# Patient Record
Sex: FEMALE | Race: WHITE | URBAN - METROPOLITAN AREA
[De-identification: names, ages, dates, MRNs, and addresses within clinical notes are randomized per-mention and may not be internally consistent; named-entity substitution may affect disease eponyms.]

---

## 2018-02-13 ENCOUNTER — OFFICE VISIT (OUTPATIENT)
Dept: ENDOCRINOLOGY | Facility: MEDICAL CENTER | Age: 63
End: 2018-02-13
Payer: COMMERCIAL

## 2018-02-13 VITALS
SYSTOLIC BLOOD PRESSURE: 112 MMHG | HEART RATE: 73 BPM | OXYGEN SATURATION: 96 % | DIASTOLIC BLOOD PRESSURE: 70 MMHG | HEIGHT: 62 IN | WEIGHT: 176 LBS | BODY MASS INDEX: 32.39 KG/M2

## 2018-02-13 DIAGNOSIS — E53.8 VITAMIN B12 DEFICIENCY: ICD-10-CM

## 2018-02-13 DIAGNOSIS — Z78.0 MENOPAUSE: ICD-10-CM

## 2018-02-13 DIAGNOSIS — E55.9 VITAMIN D DEFICIENCY: ICD-10-CM

## 2018-02-13 DIAGNOSIS — E89.0 POST-SURGICAL HYPOTHYROIDISM: ICD-10-CM

## 2018-02-13 PROCEDURE — 99205 OFFICE O/P NEW HI 60 MIN: CPT | Performed by: INTERNAL MEDICINE

## 2018-02-13 RX ORDER — LEVOTHYROXINE SODIUM 175 MCG
175 TABLET ORAL
Qty: 30 TAB | Refills: 3 | Status: SHIPPED | OUTPATIENT
Start: 2018-02-13 | End: 2018-06-13 | Stop reason: SDUPTHER

## 2018-02-13 RX ORDER — ESTRADIOL 1 MG/1
1 TABLET ORAL DAILY
Qty: 30 TAB | Refills: 3 | Status: SHIPPED | OUTPATIENT
Start: 2018-02-13 | End: 2018-06-15 | Stop reason: SDUPTHER

## 2018-02-13 RX ORDER — OMEPRAZOLE 40 MG/1
40 CAPSULE, DELAYED RELEASE ORAL DAILY
COMMUNITY

## 2018-02-13 RX ORDER — BIOTIN 1 MG
TABLET ORAL
COMMUNITY

## 2018-02-13 RX ORDER — M-VIT,TX,IRON,MINS/CALC/FOLIC 27MG-0.4MG
1 TABLET ORAL DAILY
COMMUNITY

## 2018-02-13 RX ORDER — LEVOTHYROXINE SODIUM 0.12 MG/1
125 TABLET ORAL
COMMUNITY
End: 2018-02-13

## 2018-02-13 RX ORDER — ESTRADIOL 1 MG/1
1 TABLET ORAL DAILY
Qty: 30 TAB | Refills: 3 | Status: SHIPPED | OUTPATIENT
Start: 2018-02-13 | End: 2018-02-13 | Stop reason: SDUPTHER

## 2018-02-14 NOTE — PROGRESS NOTES
New Patient Consult Note  Primary care physician: Elizabeth Varma M.D.    Reason for consult: Postsurgical hypothyroidism    HPI:  Kat Scott is a 62 y.o. old patient who comes in today for evaluation of postsurgical hypothyroidism. 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 Currently she is on 125 mcg of synthroid brand daily. She feels fatigued and tired at all times.    She also has a history of menopause and increased night sweating from it.    ROS:  Constitutional: fatigue, increase night sweats, No weight loss  Cardiac: No palpitations or racing heart  Resp: No shortness of breath  Neuro: No numbness or tinging in feet  Endo: No heat or cold intolerance, no polyuria or polydipsia  All other systems were reviewed and were negative.    Past Medical History:  There are no active problems to display for this patient.      Past Surgical History:  Past Surgical History:   Procedure Laterality Date   • ABDOMINAL EXPLORATION      tummy tuck   • BREAST RECONSTRUCTION      implants 1996   • THYROIDECTOMY         Allergies:  Patient has no known allergies.    Social History:  Social History     Social History   • Marital status: Unknown     Spouse name: N/A   • Number of children: N/A   • Years of education: N/A     Occupational History   • Not on file.     Social History Main Topics   • Smoking status: Never Smoker   • Smokeless tobacco: Never Used   • Alcohol use 3.6 oz/week     6 Shots of liquor per week   • Drug use: No   • Sexual activity: Not on file     Other Topics Concern   • Not on file     Social History Narrative   • No narrative on file       Family History:  Family History   Problem Relation Age of Onset   • Cancer Mother    • Cancer Father        Medications:    Current Outpatient Prescriptions:   •  vitamin D (CHOLECALCIFEROL) 1000 UNIT Tab, Take 1,000 Units by mouth every day., Disp: , Rfl:   •   "omeprazole (PRILOSEC) 40 MG delayed-release capsule, Take 40 mg by mouth every day., Disp: , Rfl:   •  therapeutic multivitamin-minerals (THERAGRAN-M) Tab, Take 1 Tab by mouth every day., Disp: , Rfl:   •  Probiotic Product (PROBIOTIC DAILY PO), Take  by mouth., Disp: , Rfl:   •  B Complex Vitamins (B COMPLEX 1 PO), Take  by mouth., Disp: , Rfl:   •  Cyanocobalamin (VITAMIN B-12 INJ), 1,000 mcg by Injection route., Disp: , Rfl:   •  Biotin 1000 MCG Tab, Take  by mouth., Disp: , Rfl:   •  SYNTHROID 175 MCG Tab, Take 1 Tab by mouth Every morning on an empty stomach. Synthroid brand medically ncecessary, Disp: 30 Tab, Rfl: 3  •  estradiol (ESTRACE) 1 MG Tab, Take 1 Tab by mouth every day., Disp: 30 Tab, Rfl: 3  •  progesterone (PROMETRIUM) 100 MG Cap, Take 1 Cap by mouth every day., Disp: 30 Cap, Rfl: 11    Labs: Reviewed    Physical Examination:  Vital signs: /70   Pulse 73   Ht 1.575 m (5' 2\")   Wt 79.8 kg (176 lb)   SpO2 96%   BMI 32.19 kg/m²  Body mass index is 32.19 kg/m².  General: No apparent distress, cooperative  Eyes: No scleral icterus or discharge  ENMT: Normal on external inspection of nose, lips, normal thyroid exam  Neck: No abnormal masses on inspection  Resp: Normal effort, clear to auscultation bilaterally   CVS: Regular rate and rhythm, S1 S2 normal, no murmur   Extremities: No edema  Abdomen: abdominal obesity present  Neuro: Alert and oriented  Skin: No rash  Psych: Normal mood and affect, intact memory and able to make informed decisions    Assessment and Plan:    1. Post-surgical hypothyroidism  increase  - SYNTHROID to 175 MCG Tab; Take 1 Tab by mouth Every morning on an empty stomach. [place]  Dispense: 30 Tab; Refill: 3    2. Vitamin D deficiency  Rule out Vitamin D deficiency as the contributory factor for fatigue.     3. Menopause  start  - estradiol (ESTRACE) 1 MG Tab; Take 1 Tab by mouth every day.    - progesterone (PROMETRIUM) 100 MG Cap; Take 1 " Cap by mouth every day.      4. Vitamin B12 deficiency  Rule out Vitamin B12 deficiency as the contributory factor for fatigue.      Return in about 6 weeks (around 3/27/2018).    Total face to face time spent with patient equals 60 minutes. 35/60 minutes were spent on counseling the patient about the pathophysiology of pituitary-thyroid axis, pituitary-adrenal axis, pituitary-gonadal axis, side effects and benefits of thyroid hormone, estradiol and progesterone  replacement.    Thank you for allowing me to participate in the care of this patient.    Jabari Lopez M.D.  02/14/18    CC:   Elizabeth Varma M.D.    This note was created using voice recognition software (Dragon). The accuracy of the dictation is limited by the abilities of the software. I have reviewed the note prior to signing, however some errors in grammar and context are still possible. If you have any questions related to this note please do not hesitate to contact our office.

## 2018-03-27 ENCOUNTER — OFFICE VISIT (OUTPATIENT)
Dept: ENDOCRINOLOGY | Facility: MEDICAL CENTER | Age: 63
End: 2018-03-27
Payer: COMMERCIAL

## 2018-03-27 VITALS
OXYGEN SATURATION: 96 % | BODY MASS INDEX: 32.39 KG/M2 | DIASTOLIC BLOOD PRESSURE: 70 MMHG | SYSTOLIC BLOOD PRESSURE: 110 MMHG | HEIGHT: 62 IN | WEIGHT: 176 LBS | HEART RATE: 75 BPM

## 2018-03-27 DIAGNOSIS — Z78.0 MENOPAUSE: ICD-10-CM

## 2018-03-27 DIAGNOSIS — E55.9 VITAMIN D DEFICIENCY: ICD-10-CM

## 2018-03-27 DIAGNOSIS — E53.8 VITAMIN B12 DEFICIENCY: ICD-10-CM

## 2018-03-27 DIAGNOSIS — E89.0 POST-SURGICAL HYPOTHYROIDISM: ICD-10-CM

## 2018-03-27 PROCEDURE — 99214 OFFICE O/P EST MOD 30 MIN: CPT | Performed by: INTERNAL MEDICINE

## 2018-03-27 NOTE — PROGRESS NOTES
"Endocrinology Clinic Progress Note  PCP: Elizabeth Varma M.D.    HPI:  Kat Scott is a 62 y.o. old patient who comes in today for review of endocrine problems.  Hypothyroidism: Doing fairly well at 175 µg of levothyroxine daily. Energy levels have improved and she denies any symptoms or signs consistent with hyperthyroidism.  Menopause: Doing well on estrogen and progesterone therapy.    ROS:  Constitutional: No unintentional weight loss  Endo: Denies excessive thirst or frequent urination  All other systems were reviewed and were negative.    Past Medical History:  There are no active problems to display for this patient.      Medications:    Current Outpatient Prescriptions:   •  vitamin D (CHOLECALCIFEROL) 1000 UNIT Tab, Take 1,000 Units by mouth every day., Disp: , Rfl:   •  omeprazole (PRILOSEC) 40 MG delayed-release capsule, Take 40 mg by mouth every day., Disp: , Rfl:   •  therapeutic multivitamin-minerals (THERAGRAN-M) Tab, Take 1 Tab by mouth every day., Disp: , Rfl:   •  Probiotic Product (PROBIOTIC DAILY PO), Take  by mouth., Disp: , Rfl:   •  B Complex Vitamins (B COMPLEX 1 PO), Take  by mouth., Disp: , Rfl:   •  Cyanocobalamin (VITAMIN B-12 INJ), 1,000 mcg by Injection route., Disp: , Rfl:   •  Biotin 1000 MCG Tab, Take  by mouth., Disp: , Rfl:   •  SYNTHROID 175 MCG Tab, Take 1 Tab by mouth Every morning on an empty stomach. Synthroid brand medically ncecessary, Disp: 30 Tab, Rfl: 3  •  estradiol (ESTRACE) 1 MG Tab, Take 1 Tab by mouth every day., Disp: 30 Tab, Rfl: 3  •  progesterone (PROMETRIUM) 100 MG Cap, Take 1 Cap by mouth every day., Disp: 30 Cap, Rfl: 11    Labs: Reviewed    Physical Examination:  Vital signs: /70   Pulse 75   Ht 1.575 m (5' 2\")   Wt 79.8 kg (176 lb)   SpO2 96%   BMI 32.19 kg/m²  Body mass index is 32.19 kg/m².  General: No apparent distress, cooperative  Eyes: No scleral icterus, no discharge, normal eyelids  Neck: No abnormal masses on inspection, " normal thyroid exam  Resp: Normal effort, clear to auscultation bilaterally  CVS: Regular rate and rhythm, S1 S2 normal, no murmur  Extremities: No lower extremity edema  Abdomen: abdominal obesity present  Musculoskeletal: Normal digits and nails  Skin: No rash on visible skin  Psych: Alert and oriented, normal mood and affect, intact memory and able to make informed decisions.    Assessment and Plan:    1. Post-surgical hypothyroidism  Continue current dose of levothyroxine.    2. Vitamin D deficiency  Vitamin D levels are close to the lower limit of the normal range. Advised to increase the supplementation to 2000 units daily.    3. Vitamin B12 deficiency  Vitamin B12 levels are normal    4. Menopause  Continue estrogen and progesterone.    Return in about 3 months (around 6/27/2018).    Thank you for allowing me to participate in the care of this patient.    Jabari Lopez M.D.    CC:   Elizabeth Varma M.D.    This note was created using voice recognition software (Dragon). The accuracy of the dictation is limited by the abilities of the software. I have reviewed the note prior to signing, however some errors in grammar and context are still possible. If you have any questions related to this note please do not hesitate to contact our office.

## 2018-06-13 DIAGNOSIS — E89.0 POST-SURGICAL HYPOTHYROIDISM: ICD-10-CM

## 2018-06-13 RX ORDER — LEVOTHYROXINE SODIUM 175 MCG
175 TABLET ORAL
Qty: 30 TAB | Refills: 3 | Status: SHIPPED | OUTPATIENT
Start: 2018-06-13 | End: 2018-06-18 | Stop reason: SDUPTHER

## 2018-06-15 DIAGNOSIS — Z78.0 MENOPAUSE: ICD-10-CM

## 2018-06-18 DIAGNOSIS — E89.0 POST-SURGICAL HYPOTHYROIDISM: ICD-10-CM

## 2018-06-18 RX ORDER — ESTRADIOL 1 MG/1
TABLET ORAL
Qty: 30 TAB | Refills: 0 | Status: SHIPPED | OUTPATIENT
Start: 2018-06-18 | End: 2018-07-24 | Stop reason: SDUPTHER

## 2018-06-18 RX ORDER — LEVOTHYROXINE SODIUM 175 MCG
175 TABLET ORAL
Qty: 90 TAB | Refills: 3 | Status: SHIPPED | OUTPATIENT
Start: 2018-06-18 | End: 2018-07-31 | Stop reason: SDUPTHER

## 2018-07-19 ENCOUNTER — APPOINTMENT (OUTPATIENT)
Dept: ENDOCRINOLOGY | Facility: MEDICAL CENTER | Age: 63
End: 2018-07-19

## 2018-07-24 DIAGNOSIS — Z78.0 MENOPAUSE: ICD-10-CM

## 2018-07-24 RX ORDER — ESTRADIOL 1 MG/1
TABLET ORAL
Qty: 90 TAB | Refills: 3 | Status: SHIPPED | OUTPATIENT
Start: 2018-07-24 | End: 2018-07-31 | Stop reason: SDUPTHER

## 2018-07-31 ENCOUNTER — OFFICE VISIT (OUTPATIENT)
Dept: ENDOCRINOLOGY | Facility: MEDICAL CENTER | Age: 63
End: 2018-07-31
Payer: COMMERCIAL

## 2018-07-31 VITALS
OXYGEN SATURATION: 96 % | WEIGHT: 173 LBS | HEART RATE: 77 BPM | SYSTOLIC BLOOD PRESSURE: 116 MMHG | HEIGHT: 62 IN | DIASTOLIC BLOOD PRESSURE: 62 MMHG | BODY MASS INDEX: 31.83 KG/M2

## 2018-07-31 DIAGNOSIS — Z78.0 MENOPAUSE: ICD-10-CM

## 2018-07-31 DIAGNOSIS — E89.0 POST-SURGICAL HYPOTHYROIDISM: ICD-10-CM

## 2018-07-31 DIAGNOSIS — E53.8 VITAMIN B12 DEFICIENCY: ICD-10-CM

## 2018-07-31 DIAGNOSIS — E55.9 VITAMIN D DEFICIENCY: ICD-10-CM

## 2018-07-31 PROCEDURE — 99214 OFFICE O/P EST MOD 30 MIN: CPT | Performed by: INTERNAL MEDICINE

## 2018-07-31 RX ORDER — ESTRADIOL 1 MG/1
TABLET ORAL
Qty: 90 TAB | Refills: 3 | Status: SHIPPED | OUTPATIENT
Start: 2018-07-31 | End: 2019-08-04 | Stop reason: SDUPTHER

## 2018-07-31 RX ORDER — LEVOTHYROXINE SODIUM 175 MCG
175 TABLET ORAL
Qty: 90 TAB | Refills: 3 | Status: SHIPPED | OUTPATIENT
Start: 2018-07-31 | End: 2018-09-12 | Stop reason: SDUPTHER

## 2018-07-31 RX ORDER — LEVOTHYROXINE SODIUM 175 MCG
175 TABLET ORAL
Qty: 90 TAB | Refills: 3 | Status: SHIPPED | OUTPATIENT
Start: 2018-07-31 | End: 2018-07-31 | Stop reason: SDUPTHER

## 2018-08-01 NOTE — PROGRESS NOTES
"Endocrinology Clinic Progress Note  PCP: Elizabeth Varma M.D.    HPI:  Kat Scott is a 62 y.o. old patient who comes in today for review of endocrine problems.  Hypothyroidism: Doing exceedingly well on 175 mcg of brand Synthroid daily.    Vitamin D and B12 deficiency: On vitamin D and B12 supplementation.    Menopause: Doing well on estrogen and progesterone therapy.    ROS:  Constitutional: No unintentional weight loss  Endo: Denies excessive thirst or frequent urination  All other systems were reviewed and were negative.    Past Medical History:  There are no active problems to display for this patient.      Medications:    Current Outpatient Prescriptions:   •  estradiol (ESTRACE) 1 MG Tab, TAKE ONE TABLET BY MOUTH ONE TIME DAILY, Disp: 90 Tab, Rfl: 3  •  progesterone (PROMETRIUM) 100 MG Cap, Take 1 Cap by mouth every day., Disp: 90 Cap, Rfl: 3  •  SYNTHROID 175 MCG Tab, Take 1 Tab by mouth Every morning on an empty stomach. Synthroid brand medically ncecessary, Disp: 90 Tab, Rfl: 3  •  vitamin D (CHOLECALCIFEROL) 1000 UNIT Tab, Take 1,000 Units by mouth every day., Disp: , Rfl:   •  omeprazole (PRILOSEC) 40 MG delayed-release capsule, Take 40 mg by mouth every day., Disp: , Rfl:   •  therapeutic multivitamin-minerals (THERAGRAN-M) Tab, Take 1 Tab by mouth every day., Disp: , Rfl:   •  Probiotic Product (PROBIOTIC DAILY PO), Take  by mouth., Disp: , Rfl:   •  B Complex Vitamins (B COMPLEX 1 PO), Take  by mouth., Disp: , Rfl:   •  Cyanocobalamin (VITAMIN B-12 INJ), 1,000 mcg by Injection route., Disp: , Rfl:   •  Biotin 1000 MCG Tab, Take  by mouth., Disp: , Rfl:     Labs: Reviewed    Physical Examination:  Vital signs: /62   Pulse 77   Ht 1.575 m (5' 2.01\")   Wt 78.5 kg (173 lb)   SpO2 96%   BMI 31.63 kg/m²  Body mass index is 31.63 kg/m².  General: No apparent distress, cooperative  Eyes: No scleral icterus, no discharge, normal eyelids  Neck: No abnormal masses on inspection, normal " thyroid exam  Resp: Normal effort, clear to auscultation bilaterally  CVS: Regular rate and rhythm, S1 S2 normal, no murmur  Extremities: No lower extremity edema  Abdomen: abdominal obesity present  Musculoskeletal: Normal digits and nails  Skin: No rash on visible skin  Psych: Alert and oriented, normal mood and affect, intact memory and able to make informed decisions.    Assessment and Plan:    1. Post-surgical hypothyroidism  - TSH; Future  - TRIIDOTHYRONINE; Future  - FREE THYROXINE; Future  - T3 FREE; Future  - SYNTHROID 175 MCG Tab; Take 1 Tab by mouth Every morning on an empty stomach. Synthroid brand medically ncecessary      2. Menopause  Continue   - estradiol (ESTRACE) 1 MG Tab; TAKE ONE TABLET BY MOUTH ONE TIME DAILY  Dispense: 90 Tab; Refill: 3  - progesterone (PROMETRIUM) 100 MG Cap; Take 1 Cap by mouth every day.  Dispense: 90 Cap; Refill: 3    3. Vitamin D deficiency  Continue vitamin D supplementation  - VITAMIN D,25 HYDROXY; Future    4. Vitamin B12 deficiency  Continue vitamin B12 supplementation.  - VITAMIN B12; Future      Return in about 8 months (around 3/31/2019).    Thank you for allowing me to participate in the care of this patient.    Jabari Lopez M.D.    CC:   Elizabeth Varma M.D.    This note was created using voice recognition software (Dragon). The accuracy of the dictation is limited by the abilities of the software. I have reviewed the note prior to signing, however some errors in grammar and context are still possible. If you have any questions related to this note please do not hesitate to contact our office.

## 2018-09-12 DIAGNOSIS — E89.0 POST-SURGICAL HYPOTHYROIDISM: ICD-10-CM

## 2018-09-17 RX ORDER — LEVOTHYROXINE SODIUM 175 MCG
175 TABLET ORAL
Qty: 90 TAB | Refills: 3 | Status: SHIPPED | OUTPATIENT
Start: 2018-09-17 | End: 2019-08-23 | Stop reason: SDUPTHER

## 2019-04-01 NOTE — PROGRESS NOTES
"Endocrinology Clinic Progress Note  PCP: Eliazbeth aVrma M.D.    HPI:  Kat Scott is a 63 y.o. old patient who comes in today for review of endocrine problems.    Hypothyroidism: Currently taking 175 mcg of brand Synthroid daily.     Vitamin D Deficiency:  Currently taking Vitamin D 2000 units daily.    Vitamin B 12 Deficiency:  Currently taking Vitamin B complex vitamin.     Menopause: Currently taking Estrogen 1 mg daily and Progesterone 100 mg daily.    ROS:  Constitutional: Intermittent fasting and has lost 20 pounds.  Endo: Denies excessive thirst or frequent urination  All other systems were reviewed and were negative.    Past Medical History:  There are no active problems to display for this patient.      Medications:    Current Outpatient Prescriptions:   •  B Complex Vitamins (VITAMIN-B COMPLEX) Tab, Take  by mouth., Disp: , Rfl:   •  progesterone (PROMETRIUM) 100 MG Cap, Take 1 Cap by mouth every day., Disp: 90 Cap, Rfl: 3  •  SYNTHROID 175 MCG Tab, Take 1 Tab by mouth Every morning on an empty stomach. Synthroid brand medically ncecessary, Disp: 90 Tab, Rfl: 3  •  estradiol (ESTRACE) 1 MG Tab, TAKE ONE TABLET BY MOUTH ONE TIME DAILY, Disp: 90 Tab, Rfl: 3  •  vitamin D (CHOLECALCIFEROL) 1000 UNIT Tab, Take 1,000 Units by mouth every day., Disp: , Rfl:   •  omeprazole (PRILOSEC) 40 MG delayed-release capsule, Take 40 mg by mouth every day., Disp: , Rfl:   •  therapeutic multivitamin-minerals (THERAGRAN-M) Tab, Take 1 Tab by mouth every day., Disp: , Rfl:   •  Probiotic Product (PROBIOTIC DAILY PO), Take  by mouth., Disp: , Rfl:   •  B Complex Vitamins (B COMPLEX 1 PO), Take  by mouth., Disp: , Rfl:   •  Biotin 1000 MCG Tab, Take  by mouth., Disp: , Rfl:     Labs: Reviewed    Physical Examination:  Vital signs: /70   Pulse 84   Ht 1.575 m (5' 2\")   Wt 68.5 kg (151 lb)   SpO2 93%   BMI 27.62 kg/m²  Body mass index is 27.62 kg/m². Patient's body mass index is 27.62 kg/m². Exercise and " nutrition counseling were performed at this visit.General: No apparent distress, cooperative  Eyes: No scleral icterus, no discharge, normal eyelids  Neck: No abnormal masses on inspection, normal thyroid exam  Resp: Normal effort, clear to auscultation bilaterally  CVS: Regular rate and rhythm, S1 S2 normal, no murmur  Extremities: No lower extremity edema  Abdomen: abdominal obesity present  Musculoskeletal: Normal digits and nails  Skin: No rash on visible skin  Psych: Alert and oriented, normal mood and affect, intact memory and able to make informed decisions.    Assessment and Plan:    1. Post-surgical hypothyroidism  Continue current dose.  Denies any symptoms or signs of hyperthyroidism.  Still  awaiting labs.  Stop biotin 1 week prior to getting thyroid labs done.  Can resume it again once labs are done    2. Vitamin D deficiency  Continue vitamin D supplementation    3. Vitamin B12 deficiency  Continue vitamin B12 supplementation    4. Menopause  Continue estradiol.    Return in about 5 months (around 9/2/2019).    Thank you for allowing me to participate in the care of this patient.    Jabari Lopez  This note was scribed by Brenda Diaz RN CDE  CC:   Elizabeth Varma M.D.    This note was created using voice recognition software (Dragon). The accuracy of the dictation is limited by the abilities of the software. I have reviewed the note prior to signing, however some errors in grammar and context are still possible. If you have any questions related to this note please do not hesitate to contact our office.

## 2019-04-02 ENCOUNTER — TELEPHONE (OUTPATIENT)
Dept: ENDOCRINOLOGY | Facility: MEDICAL CENTER | Age: 64
End: 2019-04-02

## 2019-04-02 ENCOUNTER — OFFICE VISIT (OUTPATIENT)
Dept: ENDOCRINOLOGY | Facility: MEDICAL CENTER | Age: 64
End: 2019-04-02
Payer: COMMERCIAL

## 2019-04-02 VITALS
HEART RATE: 84 BPM | DIASTOLIC BLOOD PRESSURE: 70 MMHG | WEIGHT: 151 LBS | SYSTOLIC BLOOD PRESSURE: 106 MMHG | HEIGHT: 62 IN | OXYGEN SATURATION: 93 % | BODY MASS INDEX: 27.79 KG/M2

## 2019-04-02 DIAGNOSIS — E53.8 VITAMIN B12 DEFICIENCY: ICD-10-CM

## 2019-04-02 DIAGNOSIS — Z78.0 MENOPAUSE: ICD-10-CM

## 2019-04-02 DIAGNOSIS — E55.9 VITAMIN D DEFICIENCY: ICD-10-CM

## 2019-04-02 DIAGNOSIS — E89.0 POST-SURGICAL HYPOTHYROIDISM: ICD-10-CM

## 2019-04-02 PROCEDURE — 99214 OFFICE O/P EST MOD 30 MIN: CPT | Performed by: INTERNAL MEDICINE

## 2019-04-02 RX ORDER — VITAMIN B COMPLEX
TABLET ORAL
COMMUNITY

## 2019-08-04 DIAGNOSIS — Z78.0 MENOPAUSE: ICD-10-CM

## 2019-08-05 RX ORDER — ESTRADIOL 1 MG/1
TABLET ORAL
Qty: 90 TAB | Refills: 2 | Status: SHIPPED | OUTPATIENT
Start: 2019-08-05 | End: 2020-05-11

## 2019-08-05 NOTE — TELEPHONE ENCOUNTER
Was the patient seen in the last year in this department? Yes 4/2/19    Does patient have an active prescription for medications requested? No     Received Request Via: Pharmacy     Estradiol 1 Mg Tab Teva          Will file in chart as: estradiol (ESTRACE) 1 MG Tab    Sig: TAKE ONE TABLET BY MOUTH ONE TIME DAILY    Original sig: TAKE ONE TABLET BY MOUTH ONE TIME DAILY     Disp:  Not specified (Pharmacy requested: 30)    Refills:  2    Start: 8/4/2019    Class: Normal    For: Menopause    Last ordered: 1 year ago by Jabari Lopez M.D. Last refill: 6/30/2019    Rx #: [id number]        Name from pharmacy: Progesterone 100 Mg Cap [facility]         Will file in chart as: progesterone (PROMETRIUM) 100 MG Cap    Sig: TAKE ONE CAPSULE BY MOUTH ONE TIME DAILY

## 2019-08-22 DIAGNOSIS — E89.0 POST-SURGICAL HYPOTHYROIDISM: ICD-10-CM

## 2019-08-22 NOTE — TELEPHONE ENCOUNTER
Was the patient seen in the last year in this department? Yes    Does patient have an active prescription for medications requested? No     Received Request Via: Patient     **Last office visit 4/2/19. Next appt scheduled for 10/23/19**

## 2019-08-23 RX ORDER — LEVOTHYROXINE SODIUM 175 MCG
175 TABLET ORAL
Qty: 90 TAB | Refills: 3 | Status: SHIPPED | OUTPATIENT
Start: 2019-08-23

## 2019-10-18 ENCOUNTER — TELEPHONE (OUTPATIENT)
Dept: ENDOCRINOLOGY | Facility: MEDICAL CENTER | Age: 64
End: 2019-10-18

## 2019-10-18 NOTE — TELEPHONE ENCOUNTER
Phone Number Called: [phone] (home)       Call outcome: left message for patient to call back regarding message below    Message: Called patient and left a message. There are no labs in epic for his upcoming appointment that were ordered by Dr. Lopez. I wanted to know if the patient had the opportunity to have them done. I asked the patient to call us back

## 2019-10-21 PROBLEM — Z78.0 MENOPAUSE: Status: ACTIVE | Noted: 2019-10-21

## 2019-10-21 PROBLEM — E55.9 MILD VITAMIN D DEFICIENCY: Status: ACTIVE | Noted: 2019-10-21

## 2019-10-21 PROBLEM — E03.9 ACQUIRED HYPOTHYROIDISM: Status: ACTIVE | Noted: 2019-10-21

## 2019-10-23 ENCOUNTER — OFFICE VISIT (OUTPATIENT)
Dept: ENDOCRINOLOGY | Facility: MEDICAL CENTER | Age: 64
End: 2019-10-23
Payer: COMMERCIAL

## 2019-10-23 VITALS
SYSTOLIC BLOOD PRESSURE: 98 MMHG | WEIGHT: 153.6 LBS | DIASTOLIC BLOOD PRESSURE: 66 MMHG | HEIGHT: 62 IN | BODY MASS INDEX: 28.26 KG/M2 | HEART RATE: 86 BPM | OXYGEN SATURATION: 99 %

## 2019-10-23 DIAGNOSIS — Z78.0 MENOPAUSE: ICD-10-CM

## 2019-10-23 DIAGNOSIS — E53.8 VITAMIN B12 DEFICIENCY: ICD-10-CM

## 2019-10-23 DIAGNOSIS — E03.9 ACQUIRED HYPOTHYROIDISM: ICD-10-CM

## 2019-10-23 DIAGNOSIS — E55.9 VITAMIN D DEFICIENCY: ICD-10-CM

## 2019-10-23 DIAGNOSIS — E55.9 MILD VITAMIN D DEFICIENCY: ICD-10-CM

## 2019-10-23 PROCEDURE — 99214 OFFICE O/P EST MOD 30 MIN: CPT | Performed by: INTERNAL MEDICINE

## 2020-02-28 ENCOUNTER — HOSPITAL ENCOUNTER (OUTPATIENT)
Dept: RADIOLOGY | Facility: MEDICAL CENTER | Age: 65
End: 2020-02-28
Attending: FAMILY MEDICINE
Payer: COMMERCIAL

## 2020-02-28 DIAGNOSIS — M54.50 LOW BACK PAIN, UNSPECIFIED BACK PAIN LATERALITY, UNSPECIFIED CHRONICITY, UNSPECIFIED WHETHER SCIATICA PRESENT: ICD-10-CM

## 2020-02-28 DIAGNOSIS — Z12.31 OTHER SCREENING MAMMOGRAM: ICD-10-CM

## 2020-02-28 PROCEDURE — 77067 SCR MAMMO BI INCL CAD: CPT

## 2020-02-28 PROCEDURE — 72100 X-RAY EXAM L-S SPINE 2/3 VWS: CPT

## 2020-04-28 ENCOUNTER — APPOINTMENT (OUTPATIENT)
Dept: ENDOCRINOLOGY | Facility: MEDICAL CENTER | Age: 65
End: 2020-04-28

## 2020-05-10 DIAGNOSIS — Z78.0 MENOPAUSE: ICD-10-CM

## 2020-05-11 RX ORDER — ESTRADIOL 1 MG/1
TABLET ORAL
Qty: 90 TAB | Refills: 0 | Status: SHIPPED | OUTPATIENT
Start: 2020-05-11 | End: 2020-08-12

## 2020-08-12 DIAGNOSIS — Z78.0 MENOPAUSE: ICD-10-CM

## 2020-08-12 RX ORDER — ESTRADIOL 1 MG/1
TABLET ORAL
Qty: 90 TAB | Refills: 0 | Status: SHIPPED | OUTPATIENT
Start: 2020-08-12

## 2020-08-12 NOTE — TELEPHONE ENCOUNTER
Received request via: Pharmacy    Was the patient seen in the last year in this department? Yes    Does the patient have an active prescription (recently filled or refills available) for medication(s) requested? No       estradiol (ESTRACE) 1 MG Tab    Sig: TAKE ONE TABLET BY MOUTH ONE TIME DAILY

## 2020-10-02 ENCOUNTER — HOSPITAL ENCOUNTER (OUTPATIENT)
Dept: LAB | Facility: MEDICAL CENTER | Age: 65
End: 2020-10-02
Attending: INTERNAL MEDICINE
Payer: COMMERCIAL

## 2020-10-02 DIAGNOSIS — E03.9 ACQUIRED HYPOTHYROIDISM: ICD-10-CM

## 2020-10-02 DIAGNOSIS — E53.8 VITAMIN B12 DEFICIENCY: ICD-10-CM

## 2020-10-02 DIAGNOSIS — E55.9 VITAMIN D DEFICIENCY: ICD-10-CM

## 2020-10-02 LAB
25(OH)D3 SERPL-MCNC: 49 NG/ML (ref 30–100)
T3 SERPL-MCNC: 131 NG/DL (ref 60–181)
T3FREE SERPL-MCNC: 4.54 PG/ML (ref 2–4.4)
T4 FREE SERPL-MCNC: 3.22 NG/DL (ref 0.93–1.7)
TSH SERPL DL<=0.005 MIU/L-ACNC: <0.005 UIU/ML (ref 0.38–5.33)
VIT B12 SERPL-MCNC: 2802 PG/ML (ref 211–911)

## 2020-10-02 PROCEDURE — 84480 ASSAY TRIIODOTHYRONINE (T3): CPT

## 2020-10-02 PROCEDURE — 84443 ASSAY THYROID STIM HORMONE: CPT

## 2020-10-02 PROCEDURE — 84481 FREE ASSAY (FT-3): CPT

## 2020-10-02 PROCEDURE — 84439 ASSAY OF FREE THYROXINE: CPT

## 2020-10-02 PROCEDURE — 82607 VITAMIN B-12: CPT

## 2020-10-02 PROCEDURE — 82306 VITAMIN D 25 HYDROXY: CPT

## 2020-10-02 PROCEDURE — 36415 COLL VENOUS BLD VENIPUNCTURE: CPT

## 2021-03-15 DIAGNOSIS — Z23 NEED FOR VACCINATION: ICD-10-CM
